# Patient Record
Sex: MALE | Race: OTHER | Employment: FULL TIME | URBAN - METROPOLITAN AREA
[De-identification: names, ages, dates, MRNs, and addresses within clinical notes are randomized per-mention and may not be internally consistent; named-entity substitution may affect disease eponyms.]

---

## 2020-11-05 ENCOUNTER — OFFICE VISIT (OUTPATIENT)
Dept: FAMILY MEDICINE CLINIC | Age: 40
End: 2020-11-05
Payer: COMMERCIAL

## 2020-11-05 ENCOUNTER — HOSPITAL ENCOUNTER (OUTPATIENT)
Age: 40
Setting detail: SPECIMEN
Discharge: HOME | End: 2020-11-05
Payer: COMMERCIAL

## 2020-11-05 VITALS
OXYGEN SATURATION: 98 % | HEIGHT: 70 IN | BODY MASS INDEX: 29.06 KG/M2 | TEMPERATURE: 98.8 F | HEART RATE: 78 BPM | RESPIRATION RATE: 16 BRPM | WEIGHT: 203 LBS

## 2020-11-05 PROCEDURE — G8419 CALC BMI OUT NRM PARAM NOF/U: HCPCS | Performed by: NURSE PRACTITIONER

## 2020-11-05 PROCEDURE — G8484 FLU IMMUNIZE NO ADMIN: HCPCS | Performed by: NURSE PRACTITIONER

## 2020-11-05 PROCEDURE — 99202 OFFICE O/P NEW SF 15 MIN: CPT | Performed by: NURSE PRACTITIONER

## 2020-11-05 PROCEDURE — 4004F PT TOBACCO SCREEN RCVD TLK: CPT | Performed by: NURSE PRACTITIONER

## 2020-11-05 PROCEDURE — G8428 CUR MEDS NOT DOCUMENT: HCPCS | Performed by: NURSE PRACTITIONER

## 2020-11-05 ASSESSMENT — ENCOUNTER SYMPTOMS
COUGH: 1
BACK PAIN: 0
SORE THROAT: 1
SINUS PAIN: 0
VOMITING: 0
EYE PAIN: 0
ABDOMINAL PAIN: 0
NAUSEA: 0
SHORTNESS OF BREATH: 0
DIARRHEA: 0

## 2020-11-06 NOTE — PROGRESS NOTES
[address] [facility]  [address] [place] [address]  Dept: [phone]  Dept Fax: [phone]    Nathan Bergman is a 35 y.o. male who presents to the urgent care today for his medicalconditions/complaints as noted below. Nathan Bergman is c/o of Covid Testing (dizziness, cough, sore throat x 1 week )      HPI:     50-year-old male patient presents with complaint of sore throat, cough. Patient has had symptoms for approximately 1 week. Reports positive COVID-19 exposure. Reports sore throat worse with swallowing. Reports mild dry cough. Reports intermittent dizziness. Denies fevers or chills. Denies chest pain or shortness of breath. Denies vomiting diarrhea. Denies loss of taste smell. Treatments tried include none. .      Past Medical History:   Diagnosis Date    Kidney stone     Ulcerative colitis ([facility] [address]         Current Outpatient Medications   Medication Sig Dispense Refill    HYDROcodone-acetaminophen (NORCO) 5-325 MG per tablet 1-2 tabs every 4 hours prn (Patient not taking: Reported on 11/5/2020) 20 tablet 0    tamsulosin (FLOMAX) 0.4 MG capsule Take 1 capsule by mouth daily. (Patient not taking: Reported on 11/5/2020) 30 capsule 0    balsalazide (COLAZAL) 750 MG capsule Take 2,250 mg by mouth daily. 3 CAPS,ONCE DAILY       No current facility-administered medications for this visit. No Known Allergies    Subjective:      Review of Systems   Constitutional: Negative for chills and fatigue. HENT: Positive for sore throat. Negative for congestion, ear pain and sinus pain. Eyes: Negative for pain and visual disturbance. Respiratory: Positive for cough. Negative for shortness of breath. Cardiovascular: Negative for chest pain and palpitations. Gastrointestinal: Negative for abdominal pain, diarrhea, nausea and vomiting. Genitourinary: Negative for penile pain and testicular pain.    Musculoskeletal: Negative for back pain, joint swelling and neck pain. Skin: Negative for rash. Neurological: Positive for dizziness. Negative for light-headedness. Hematological: Does not bruise/bleed easily. All other systems reviewed and are negative. Objective:     Physical Exam  Vitals signs and nursing note reviewed. Constitutional:       General: He is not in acute distress. Appearance: Normal appearance. He is not toxic-appearing. HENT:      Nose: Nose normal.      Mouth/Throat:      Mouth: Mucous membranes are moist.      Pharynx: Posterior oropharyngeal erythema present. Cardiovascular:      Rate and Rhythm: Normal rate. Pulmonary:      Effort: Pulmonary effort is normal.      Breath sounds: Normal breath sounds. Neurological:      General: No focal deficit present. Mental Status: He is alert and oriented to person, place, and time. Pulse 78   Temp 98.8 °F (37.1 °C)   Resp 16   Ht 5' 10\" (1.778 m)   Wt 203 lb (92.1 kg)   SpO2 98%   BMI 29.13 kg/m²   Lab Review   No visits with results within 2 Month(s) from this visit. Latest known visit with results is:   Hospital Outpatient Visit on 05/03/2013   Component Date Value    Surgical Pathology Report 05/03/2013                      Value:(NOTE)                          [id number]                          [employer]                          [facility]                          ANATOMIC PATHOLOGY                          [address] [place] [address].   [place], [address]                          [phone]                          Fax: [phone]                          [facility]                                                    Patient Name: Ashlyn Mcelroy                          MR#: [id number]                          Specimen #[id number]                                                                              Final Diagnosis                          GALLBLADDER: -  CHOLELITHIASIS.                              -  CHRONIC CHOLECYSTITIS. TEETEE Gross                          **Electronically Signed Out**                                 tb1/5/6/2013                          Clinical Information                          Pre-op Diagnosis:  CHOLELITHIASIS                           Operative Findings:   GALLBLADDER AND CONTENTS                                                    Source:                          1: GALLBLADDER AND CONTENTS                                                    Gross Description                          \"BUZZ BATES, GALLBLADDER AND CONTENTS\" Multiple gallbladder                          fragments, 8.8 x 3.0 x 2.0 cm in aggregate. Identifiable serosal                          surfaces are pink-tan, and the liver bed is coarse brown-yellow. The                          wall is 0.1 cm in thickness, and within the gallbladder, there are                          small yellow-tan calculi, 5.0 x 3.0 x 1.5 cm in aggregate. The mucosa                          is pink-tan with a few areas of granularity. Cystic duct margin and                          sections of gallbladder mucosa 1cs. [id number]                                                                                                           Microscopic Description                          Microscopic examination performed. TR-0           Assessment:       Diagnosis Orders   1. Cough with exposure to COVID-19 virus  COVID-19 Ambulatory   2. Sore throat  COVID-19 Ambulatory       Plan:      Return if symptoms worsen or fail to improve. No orders of the defined types were placed in this encounter. Patient's son tested negative for strep during visit  Recommend isolation pending covid results. Discussed treatment regimen to include rest, hydration, tylenol prn.   Discussed deep breathing exercises. Discussed to monitor for progression of symptoms. Follow up as needed. Will contact patient for results       Patient given educational materials - see patient instructions. Discussed use, benefit, and side effects of prescribed medications. All patientquestions answered. Pt voiced understanding. This note was transcribed using dictation with Dragon services. Efforts were made to correct any errors but some words may be misinterpreted.      Electronically signed by ANJELICA Campuzano CNP on 11/5/2020at 8:49 PM

## 2020-11-08 LAB — SARS-COV-2, NAA: NOT DETECTED

## 2020-11-09 ENCOUNTER — TELEPHONE (OUTPATIENT)
Dept: FAMILY MEDICINE CLINIC | Age: 40
End: 2020-11-09